# Patient Record
Sex: FEMALE | Race: BLACK OR AFRICAN AMERICAN | NOT HISPANIC OR LATINO | Employment: FULL TIME | URBAN - METROPOLITAN AREA
[De-identification: names, ages, dates, MRNs, and addresses within clinical notes are randomized per-mention and may not be internally consistent; named-entity substitution may affect disease eponyms.]

---

## 2023-12-12 ENCOUNTER — HOSPITAL ENCOUNTER (OUTPATIENT)
Dept: RADIOLOGY | Facility: HOSPITAL | Age: 48
Discharge: HOME | End: 2023-12-12
Payer: COMMERCIAL

## 2023-12-12 ENCOUNTER — APPOINTMENT (OUTPATIENT)
Dept: RADIOLOGY | Facility: HOSPITAL | Age: 48
End: 2023-12-12
Payer: COMMERCIAL

## 2023-12-12 ENCOUNTER — OFFICE VISIT (OUTPATIENT)
Dept: HEMATOLOGY/ONCOLOGY | Facility: HOSPITAL | Age: 48
End: 2023-12-12
Payer: COMMERCIAL

## 2023-12-12 VITALS
SYSTOLIC BLOOD PRESSURE: 101 MMHG | DIASTOLIC BLOOD PRESSURE: 67 MMHG | HEIGHT: 65 IN | BODY MASS INDEX: 31 KG/M2 | OXYGEN SATURATION: 100 % | TEMPERATURE: 96.8 F | WEIGHT: 186.07 LBS | RESPIRATION RATE: 17 BRPM | HEART RATE: 84 BPM

## 2023-12-12 DIAGNOSIS — Z85.3 PERSONAL HISTORY OF MALIGNANT NEOPLASM OF BREAST: ICD-10-CM

## 2023-12-12 DIAGNOSIS — R92.8 OTHER ABNORMAL AND INCONCLUSIVE FINDINGS ON DIAGNOSTIC IMAGING OF BREAST: ICD-10-CM

## 2023-12-12 DIAGNOSIS — N63.20 UNSPECIFIED LUMP IN THE LEFT BREAST, UNSPECIFIED QUADRANT: ICD-10-CM

## 2023-12-12 DIAGNOSIS — Z85.3 HISTORY OF RIGHT BREAST CANCER: Primary | ICD-10-CM

## 2023-12-12 PROCEDURE — 77066 DX MAMMO INCL CAD BI: CPT | Performed by: STUDENT IN AN ORGANIZED HEALTH CARE EDUCATION/TRAINING PROGRAM

## 2023-12-12 PROCEDURE — 77062 BREAST TOMOSYNTHESIS BI: CPT | Performed by: STUDENT IN AN ORGANIZED HEALTH CARE EDUCATION/TRAINING PROGRAM

## 2023-12-12 PROCEDURE — 99215 OFFICE O/P EST HI 40 MIN: CPT | Mod: 25 | Performed by: NURSE PRACTITIONER

## 2023-12-12 PROCEDURE — 76642 ULTRASOUND BREAST LIMITED: CPT | Performed by: STUDENT IN AN ORGANIZED HEALTH CARE EDUCATION/TRAINING PROGRAM

## 2023-12-12 PROCEDURE — 99215 OFFICE O/P EST HI 40 MIN: CPT | Performed by: NURSE PRACTITIONER

## 2023-12-12 PROCEDURE — 77066 DX MAMMO INCL CAD BI: CPT

## 2023-12-12 PROCEDURE — 76642 ULTRASOUND BREAST LIMITED: CPT | Mod: LT

## 2023-12-12 ASSESSMENT — PAIN SCALES - GENERAL: PAINLEVEL: 0-NO PAIN

## 2023-12-12 NOTE — PROGRESS NOTES
Oncology Follow-Up    Familia GUZMAN [facility]  [id number]        Clinical stage IIB, T2 N1 M0 right invasive ductal carcinoma, ER/CO positive, HER-2  positive,  Oncology History    No history exists.   Cancer History:      Treatment History:    1. Clinical stage IIB, T2 N1 M0 right invasive ductal carcinoma, ER/CO positive, HER-2  positive, diagnosed in March 2009. She was treated with preoperative Taxotere, carboplatin, and Herceptin , initiated on April 28, 2009, with cycle 6 of chemotherapy completed on August 24, 2009. Maintenance Herceptin was continued to complete the full year of Herceptin in April 2010.   2. Lumpectomy and axillary lymph node dissection completed on October 2, 2009, which showed a  complete pathologic response. All 5 lymph nodes were negative. On final excision, there was no tumor identified.   3. Radiation therapy to the right breast, upper internal mammary tod chain, and right supraclavicular fossa, completed on April 20, 2010,  with boost to the tumor bed.   4. Tamoxifen initiated on June 4, 2010, with a 10-year course planned. Tamoxifen discontinued in June 2020.      Evelio Barbosa presents for her yearly survivorship visit. She got  this past October and is very happy. Her HGBA1c has gone from 13.4 to 6.5. She has worked at exercise and changing her diet. She is off one of her diabetic medications. Familia rates her energy level as 8/10 and reports no distress. She denies any unusual headaches, balance issues, depression, cough, shortness of breath, problems swallowing, changes in chest/breast area, abdominal pain, bone or muscle pain, vaginal bleeding, rectal bleeding, blood in the urine, vaginal dryness, swelling arms or legs, new or unusual skin moles or lesions.           Objective      Vitals:    12/12/23 0950   BP: 101/67   Pulse: 84   Resp: 17   Temp: 36 °C (96.8 °F)   SpO2: 100%        Constitutional: Well developed, alert/oriented x3, no distress, cooperative   Eyes:  clear sclera   ENMT: mucous membranes moist, no apparent lesions   Head/Neck: Neck supple, no bruits   Respiratory/Thorax: Patent airways, normal breath sounds with good chest expansion   Cardiovascular: Regular rate and rhythm, no murmurs, 2+ equal pulses of the extremities,   Gastrointestinal: Nondistended, soft, non-tender, no masses palpable, no organomegaly   Musculoskeletal: ROM intact, no joint swelling, normal strength   Extremities: normal extremities, no edema, cyanosis, contusions or wounds   Neurological: alert and oriented x3,  normal strength   Breast:     Lymphatic: No significant lymphadenopathy   Psychological: Appropriate mood and behavior   Skin: Warm and dry, no lesions, no rashes      Physical Exam  Chest:          Comments: Right breast + for breast conserving surgeyr with a well healed superior/central incision; no masses, nodules, skin changes, discharge. She has stable hyperpigmentation and thickened skin.        Lab Results   Component Value Date    WBC 3.3 (L) 09/03/2021    HGB 12.5 09/03/2021    HCT 39.0 09/03/2021    MCV 82 09/03/2021     09/03/2021       Chemistry    Lab Results   Component Value Date/Time     (L) 09/03/2021 1110    K 5.7 (H) 09/03/2021 1110     09/03/2021 1110    CO2 25 09/03/2021 1110    BUN 14 09/03/2021 1110    CREATININE 0.76 09/03/2021 1110    Lab Results   Component Value Date/Time    CALCIUM 9.5 09/03/2021 1110    ALKPHOS 72 09/03/2021 1110    AST 20 09/03/2021 1110    ALT 22 09/03/2021 1110    BILITOT 0.5 09/03/2021 1110              Imaging:    PACS Images     Show images for BI US breast limited left  Signed by    Signed Time Phone Pager   Jackie Temple MD 12/12/2023 12:44 [phone]     Exam Information    Status Exam Begun Exam Ended   Final 12/12/2023 09:10 12/12/2023 09:35     Study Result    Narrative & Impression   Interpreted By:  Jackie Temple,   STUDY:  BI MAMMO BILATERAL DIAGNOSTIC TOMOSYNTHESIS; BI US BREAST  LIMITED  LEFT;  12/12/2023 9:08 am; 12/12/2023 9:35 am      ACCESSION NUMBER(S):  [id number]; [id number]      ORDERING CLINICIAN:  SHARAD AGUILERA      INDICATION:  History of right lumpectomy and radiation. 12 month follow-up of a  probably benign left breast mass.      COMPARISON:  12/08/2022, 12/07/2021, 12/01/2020.      FINDINGS:  2D and tomosynthesis images were reviewed at 1 mm slice thickness.      Density:  There are areas of scattered fibroglandular tissue.      Postoperative scarring and surgical clips is seen in the central  superior right breast at posterior depth. No suspicious masses or  calcifications are identified.      Ultrasound: Targeted ultrasound with elastography of the left breast  was performed. At 11 o'clock 10 cm from the nipple, there is a stable  well-circumscribed hypoechoic mass measuring 0.4 x 0.2 x 0.6 cm. This  is unchanged since 12/22/2022. There is no internal vascularity. It  is soft on elastography.      IMPRESSION:  1. Stable probably benign mass in the left breast. Final ultrasound  follow-up is recommended in 12 months at the time of annual bilateral  mammogram.  2. No mammographic evidence of malignancy.      BI-RADS CATEGORY:      BI-RADS Category:  3 Probably Benign.  Recommendation:  Short Interval Follow-up.  Recommended Date:  1 Year.  Laterality:  Bilateral.         Assessment/Plan    Familia is a 47 yo woman with a remote hx of T2N1 Her2+ right breast cancer diagnosed in March 2009. She is s/p neoadjuvant TCH (with complete pathological response), partial mastectomy, XRT, and completed 10 years of tamoxifen in June 2020. There is no evidence of recurrent disease on today's exam.   Plan:  Exam is negative.  Mammogram and ultrasound are negative.  Continue healthy lifestyle of plant based diet, monthly Breast self exams, exercising at least 2.5hours/week.  We reviewed signs/symptoms of recurrence including new masses, new pigmented lesion, tugging or pulling of the  skin, nipple discharge, rash in or around the chest area, or any new finding that doesn't resolve within a 2-3 weeks.  All of Familia's questions/concerns were addressed.  Over 20 minutes of time was spent with this patient with >50% of the time with education, counseling, and coordination of care.   I will see her back in one year with her mammogram.    Diagnoses and all orders for this visit:  History of right breast cancer    Colonoscopy, GYN screenings, and vaccines are up to date      Mary Anne Haskins, APRN-CNP

## 2023-12-12 NOTE — PATIENT INSTRUCTIONS
1. Exercise 2.5 hours per week; bone strengthening, cardio-vascular, resistance training.  2. Please do self breast exams monthly.  3. Keep alcohol under 3 drinks per week.  4. Sun safety - limit sun exposure from 11a-2p when its at its hottest, apply 15-30 sun block and re-apply every 1-2 hours if perspiring or swimming.  5. Eat a plant based diet, add in oily fishes such as mackerel, tuna, and salmon.  6. Get in at least 1,000 mg of calcium per day through diet or supplement for bone strength. Examples of foods higher in calcium are milk, yogurt, fruited yogurt, oranges, fortified orange juice, almonds, almond milk, broccoli, spinach, bok aria, mustard greens, puddings, custards, ice cream, fortified cereals, bars, and crackers.   7. Please call the office if any new mass or rash in or around breast, or any uncontrolled symptoms that last over 2-3 weeks at [phone].  8. Your exam and ultrasound are negative! They will do another ultrasound with your mammogram in one year.  9. It was nice seeing you today, Familia! I will see you back in one year.   Thank you for choosing [facility] for your care.  Have a Happy, Healthy, Holiday Season!

## 2024-03-04 PROCEDURE — 88305 TISSUE EXAM BY PATHOLOGIST: CPT

## 2024-03-04 PROCEDURE — 88305 TISSUE EXAM BY PATHOLOGIST: CPT | Performed by: DENTIST

## 2024-03-05 ENCOUNTER — LAB REQUISITION (OUTPATIENT)
Dept: LAB | Facility: HOSPITAL | Age: 49
End: 2024-03-05
Payer: COMMERCIAL

## 2024-03-12 LAB
LABORATORY COMMENT REPORT: NORMAL
PATH REPORT.FINAL DX SPEC: NORMAL
PATH REPORT.GROSS SPEC: NORMAL
PATH REPORT.RELEVANT HX SPEC: NORMAL
PATH REPORT.TOTAL CANCER: NORMAL

## 2024-12-17 ENCOUNTER — APPOINTMENT (OUTPATIENT)
Dept: RADIOLOGY | Facility: HOSPITAL | Age: 49
End: 2024-12-17
Payer: COMMERCIAL

## 2024-12-17 ENCOUNTER — APPOINTMENT (OUTPATIENT)
Dept: HEMATOLOGY/ONCOLOGY | Facility: HOSPITAL | Age: 49
End: 2024-12-17
Payer: COMMERCIAL

## 2025-01-03 PROBLEM — Z92.29 HISTORY OF TAMOXIFEN THERAPY: Status: ACTIVE | Noted: 2025-01-03

## 2025-01-03 PROBLEM — Z92.21 HISTORY OF ANTINEOPLASTIC CHEMOTHERAPY: Status: ACTIVE | Noted: 2025-01-03

## 2025-01-03 PROBLEM — Z09 ONCOLOGY FOLLOW-UP ENCOUNTER: Status: ACTIVE | Noted: 2025-01-03

## 2025-01-03 PROBLEM — Z92.3 HISTORY OF EXTERNAL BEAM RADIATION THERAPY: Status: ACTIVE | Noted: 2025-01-03

## 2025-01-04 NOTE — PROGRESS NOTES
Oncology Follow-Up    Familia GUZMAN [facility]  [id number]          Clinical stage IIB, T2 N1 M0 right invasive ductal carcinoma, ER/OK positive, HER-2  positive,       Treatment History:    1. Clinical stage IIB, T2 N1 M0 right invasive ductal carcinoma, ER/OK positive, HER-2  positive, diagnosed in March 2009. She was treated with preoperative Taxotere, carboplatin, and Herceptin , initiated on April 28, 2009, with cycle 6 of chemotherapy completed on August 24, 2009. Maintenance Herceptin was continued to complete the full year of Herceptin in April 2010.   2. Lumpectomy and axillary lymph node dissection completed on October 2, 2009, which showed a  complete pathologic response. All 5 lymph nodes were negative. On final excision, there was no tumor identified.   3. Radiation therapy to the right breast, upper internal mammary tod chain, and right supraclavicular fossa, completed on April 20, 2010,  with boost to the tumor bed.   4. Tamoxifen initiated on June 4, 2010, with a 10-year course planned. Tamoxifen discontinued in June 2020.      Subjective    Familia presents for her Oncology Follow Up Visit. She reports poor sleep and rates her energy level as 5-6/10. She continues to exercise with bands and cardio work outs. Familia has a lot of distress r/t her job and rates it 10/10. She is looking for a new position. Familia sees Dr. Ramsey in primary care. She denies any unusual headaches, balance issues, depression, cough, shortness of breath, problems swallowing, changes in chest/breast area, abdominal pain, bone or muscle pain, vaginal bleeding, rectal bleeding, blood in the urine, vaginal dryness, swelling arms or legs, new or unusual skin moles or lesions.       Objective      Vitals:    01/07/25 1042   BP: 103/69   Pulse: 82   Resp: 18   Temp: 36.3 °C (97.3 °F)   SpO2: 98%        Constitutional: Well developed, alert/oriented x3, no distress, cooperative   Eyes: clear sclera   ENMT: mucous membranes moist, no  apparent lesions   Head/Neck: Neck supple, no bruits   Respiratory/Thorax: Patent airways, normal breath sounds with good chest expansion   Cardiovascular: Regular rate and rhythm, no murmurs, 2+ equal pulses of the extremities,   Gastrointestinal: Nondistended, soft, non-tender, no masses palpable, no organomegaly   Musculoskeletal: ROM intact, no joint swelling, normal strength   Extremities: normal extremities, no edema, cyanosis, contusions or wounds   Neurological: alert and oriented x3,  normal strength   Breast:     Lymphatic: No significant lymphadenopathy   Psychological: Appropriate mood and behavior   Skin: Warm and dry, no lesions, no rashes      Physical Exam  Chest:          Comments: Right breast positive for breast conserving surgery with well healed central/superior and right axillary incisions; no masses, nodules, skin changes, discharge. Left breast without masses, nodules, skin changes, discharge.          Lab Results   Component Value Date    WBC 3.3 (L) 2021    HGB 12.5 2021    HCT 39.0 2021    MCV 82 2021     2021       Chemistry    Lab Results   Component Value Date/Time     (L) 2021 1110    K 5.7 (H) 2021 1110     2021 1110    CO2 25 2021 1110    BUN 14 2021 1110    CREATININE 0.76 2021 1110    Lab Results   Component Value Date/Time    CALCIUM 9.5 2021 1110    ALKPHOS 72 2021 1110    AST 20 2021 1110    ALT 22 2021 1110    BILITOT 0.5 2021 1110         Imagin2023 12:44 [phone]     Exam Information    Status Exam Begun Exam Ended   Final 2023 08:21 2023 09:08     Study Result    Narrative & Impression   Interpreted By:  Jackie Temple,   STUDY:  BI MAMMO BILATERAL DIAGNOSTIC TOMOSYNTHESIS; BI US BREAST LIMITED  LEFT;  2023 9:08 am; 2023 9:35 am      ACCESSION NUMBER(S):  [id number]; [id number]      ORDERING CLINICIAN:  SHARAD  SUSTIN      INDICATION:  History of right lumpectomy and radiation. 12 month follow-up of a  probably benign left breast mass.      COMPARISON:  12/08/2022, 12/07/2021, 12/01/2020.      FINDINGS:  2D and tomosynthesis images were reviewed at 1 mm slice thickness.      Density:  There are areas of scattered fibroglandular tissue.      Postoperative scarring and surgical clips is seen in the central  superior right breast at posterior depth. No suspicious masses or  calcifications are identified.      Ultrasound: Targeted ultrasound with elastography of the left breast  was performed. At 11 o'clock 10 cm from the nipple, there is a stable  well-circumscribed hypoechoic mass measuring 0.4 x 0.2 x 0.6 cm. This  is unchanged since 12/22/2022. There is no internal vascularity. It  is soft on elastography.      IMPRESSION:  1. Stable probably benign mass in the left breast. Final ultrasound  follow-up is recommended in 12 months at the time of annual bilateral  mammogram.  2. No mammographic evidence of malignancy.      BI-RADS CATEGORY:      BI-RADS Category:  3 Probably Benign.  Recommendation:  Short Interval Follow-up.  Recommended Date:  1 Year.  Laterality:  Bilateral.       Assessment/Plan    Familia is a 48 yo woman with a remote hx of T2N1 right Her2 positive IDC diagnosed March 2009. She is s/p neoadjuvant TCH, XRT, and tamoxifen for 10 years completed course in June 2020. There is no evidence of recurrent disease on today's exam.     Plan:  Exam is negative.  Mammogram results pending. I will call if any follow up needed, otherwise will be notified via MyChart and mail.  Try magnesium cream to bottom of feet before bed.  Encouraged monthly breast self exams, plant based diet, keep alcohol <3 drinks/week, exercise at least 2.5 hours/week.  We reviewed signs/symptoms of recurrence including new masses, new pigmented lesion, tugging or pulling of the skin, nipple discharge, rash in or around the chest area, or any  new finding that doesn't resolve within a 2-3 weeks.  All of Familia's questions/concerns were addressed.  Over 25 minutes of time was spent with this patient with >50% of the time with education, counseling, and coordination of care.   I will see Familia back in one year with her mammogram. She will call with any concerns.    Diagnoses and all orders for this visit:  History of right breast cancer  -     Clinic Appointment Request Follow Up; SHARAD AGUILERA; [facility]  -     Clinic Appointment Request; Future  -     BI mammo bilateral screening tomosynthesis; Future  History of antineoplastic chemotherapy  History of tamoxifen therapy  Oncology follow-up encounter  History of external beam radiation therapy  Screening mammogram for breast cancer  -     Clinic Appointment Request; Future  -     BI mammo bilateral screening tomosynthesis; Future      GLADYS Youngblood-CNP

## 2025-01-07 ENCOUNTER — HOSPITAL ENCOUNTER (OUTPATIENT)
Dept: RADIOLOGY | Facility: HOSPITAL | Age: 50
Discharge: HOME | End: 2025-01-07
Payer: COMMERCIAL

## 2025-01-07 ENCOUNTER — OFFICE VISIT (OUTPATIENT)
Dept: HEMATOLOGY/ONCOLOGY | Facility: HOSPITAL | Age: 50
End: 2025-01-07
Payer: COMMERCIAL

## 2025-01-07 VITALS
OXYGEN SATURATION: 98 % | RESPIRATION RATE: 18 BRPM | BODY MASS INDEX: 30.61 KG/M2 | DIASTOLIC BLOOD PRESSURE: 69 MMHG | HEART RATE: 82 BPM | TEMPERATURE: 97.3 F | WEIGHT: 185.98 LBS | SYSTOLIC BLOOD PRESSURE: 103 MMHG

## 2025-01-07 DIAGNOSIS — Z92.3 HISTORY OF EXTERNAL BEAM RADIATION THERAPY: ICD-10-CM

## 2025-01-07 DIAGNOSIS — Z92.21 HISTORY OF ANTINEOPLASTIC CHEMOTHERAPY: ICD-10-CM

## 2025-01-07 DIAGNOSIS — Z92.29 HISTORY OF TAMOXIFEN THERAPY: ICD-10-CM

## 2025-01-07 DIAGNOSIS — Z12.31 SCREENING MAMMOGRAM FOR BREAST CANCER: ICD-10-CM

## 2025-01-07 DIAGNOSIS — Z85.3 HISTORY OF RIGHT BREAST CANCER: Primary | ICD-10-CM

## 2025-01-07 DIAGNOSIS — Z09 ONCOLOGY FOLLOW-UP ENCOUNTER: ICD-10-CM

## 2025-01-07 DIAGNOSIS — Z85.3 HISTORY OF RIGHT BREAST CANCER: ICD-10-CM

## 2025-01-07 DIAGNOSIS — R92.8 OTHER ABNORMAL AND INCONCLUSIVE FINDINGS ON DIAGNOSTIC IMAGING OF BREAST: ICD-10-CM

## 2025-01-07 DIAGNOSIS — Z85.3 PERSONAL HISTORY OF MALIGNANT NEOPLASM OF BREAST: ICD-10-CM

## 2025-01-07 PROCEDURE — 77062 BREAST TOMOSYNTHESIS BI: CPT | Performed by: STUDENT IN AN ORGANIZED HEALTH CARE EDUCATION/TRAINING PROGRAM

## 2025-01-07 PROCEDURE — 76982 USE 1ST TARGET LESION: CPT | Mod: LT

## 2025-01-07 PROCEDURE — 77062 BREAST TOMOSYNTHESIS BI: CPT

## 2025-01-07 PROCEDURE — 99214 OFFICE O/P EST MOD 30 MIN: CPT | Mod: 25 | Performed by: NURSE PRACTITIONER

## 2025-01-07 PROCEDURE — 99214 OFFICE O/P EST MOD 30 MIN: CPT | Performed by: NURSE PRACTITIONER

## 2025-01-07 PROCEDURE — 1036F TOBACCO NON-USER: CPT | Performed by: NURSE PRACTITIONER

## 2025-01-07 PROCEDURE — 76642 ULTRASOUND BREAST LIMITED: CPT | Mod: LT

## 2025-01-07 PROCEDURE — 76642 ULTRASOUND BREAST LIMITED: CPT | Performed by: STUDENT IN AN ORGANIZED HEALTH CARE EDUCATION/TRAINING PROGRAM

## 2025-01-07 PROCEDURE — 77066 DX MAMMO INCL CAD BI: CPT | Performed by: STUDENT IN AN ORGANIZED HEALTH CARE EDUCATION/TRAINING PROGRAM

## 2025-01-07 RX ORDER — ERGOCALCIFEROL 1.25 MG/1
1.25 CAPSULE ORAL
COMMUNITY
Start: 2016-05-12

## 2025-01-07 RX ORDER — SULFAMETHOXAZOLE AND TRIMETHOPRIM 800; 160 MG/1; MG/1
1 TABLET ORAL 2 TIMES DAILY
COMMUNITY
Start: 2025-01-03 | End: 2025-01-10

## 2025-01-07 ASSESSMENT — PAIN SCALES - GENERAL: PAINLEVEL_OUTOF10: 0-NO PAIN

## 2025-01-07 NOTE — PATIENT INSTRUCTIONS
1. Exercise 2.5 hours per week; bone strengthening, cardio-vascular, resistance training.  2. Please do self breast exams monthly.  3. Keep alcohol under 3 drinks per week.  4. Sun safety - limit sun exposure from 11a-2p when its at its hottest, apply 15-30 sun block and re-apply every 1-2 hours if perspiring or swimming.  5. Eat a plant based diet, add in oily fishes such as mackerel, tuna, and salmon.  6. Get in at least 1,000 mg of calcium per day through diet or supplement for bone strength. Examples of foods higher in calcium are milk, yogurt, fruited yogurt, oranges, fortified orange juice, almonds, almond milk, broccoli, spinach, bok aria, mustard greens, puddings, custards, ice cream, fortified cereals, bars, and crackers.   7. Your exam is negative. Your mammogram is pending. I will call you if any follow up is needed.  8. For better sleep - use magnesium cream to the bottom of your feet before bed.  9. Please call the office if any new mass or rash in or around breast, or any uncontrolled symptoms that last over 2-3 weeks at [phone].  10. It was nice seeing you today, Familia! I will see you back in one year. Please call with any concerns.  Thank you for choosing [facility] for your care.